# Patient Record
Sex: MALE | Race: OTHER | NOT HISPANIC OR LATINO
[De-identification: names, ages, dates, MRNs, and addresses within clinical notes are randomized per-mention and may not be internally consistent; named-entity substitution may affect disease eponyms.]

---

## 2023-08-10 ENCOUNTER — APPOINTMENT (OUTPATIENT)
Dept: ORTHOPEDIC SURGERY | Facility: CLINIC | Age: 43
End: 2023-08-10
Payer: COMMERCIAL

## 2023-08-10 DIAGNOSIS — Z00.00 ENCOUNTER FOR GENERAL ADULT MEDICAL EXAMINATION W/OUT ABNORMAL FINDINGS: ICD-10-CM

## 2023-08-10 DIAGNOSIS — M77.11 LATERAL EPICONDYLITIS, RIGHT ELBOW: ICD-10-CM

## 2023-08-10 PROCEDURE — 99203 OFFICE O/P NEW LOW 30 MIN: CPT

## 2023-08-10 RX ORDER — CELECOXIB 200 MG/1
200 CAPSULE ORAL
Qty: 27 | Refills: 1 | Status: ACTIVE | COMMUNITY
Start: 2023-08-10 | End: 1900-01-01

## 2023-08-10 NOTE — PHYSICAL EXAM
[de-identified] : Right elbow with definite tenderness in the lateral epicondylar region.  There is no erythema and no swelling range of motion of the elbow is full.  There is no instability varus valgus stress at full extension.

## 2023-08-10 NOTE — HISTORY OF PRESENT ILLNESS
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  Patient states he knows he has a diagnosis of tennis elbow he was given a cortisone injection 3 months ago afforded maybe 2 weeks of relief with having consistent discomfort in the elbow ever since sometimes it is quite severe it does not seem to be improving.

## 2023-08-10 NOTE — DISCUSSION/SUMMARY
[de-identified] : Patient I reviewed the underlying etiology of the patient's right elbow symptoms.  He has a clinical exam and history are consistent with a diagnosis of tennis elbow/lateral epicondyle this.  We talked about therapeutic modalities patient has been doing physical therapy already with minimal sustained relief had a one-time cortisone injection the past which afforded only 2 weeks of relief.  This point no one has yet placed on anti-inflammatory replacement Celebrex 200 mg p.o. twice daily for 5-day course then to be taken thereafter as needed.  The reason risk and benefits of medication discussed in detail potential side effects we reviewed his current medication profile there appears to be no relative contraindication to its use.  Patient will return in a week if not thoroughly improved at that point we may consider cortisone injection and possible Aircast for chronic suppression.  Today's consultation was 30 minutes.

## 2023-11-08 ENCOUNTER — APPOINTMENT (OUTPATIENT)
Age: 43
End: 2023-11-08

## 2023-11-08 ENCOUNTER — EMERGENCY (EMERGENCY)
Facility: HOSPITAL | Age: 43
LOS: 1 days | Discharge: HOME | End: 2023-11-08
Payer: COMMERCIAL

## 2023-11-08 VITALS
SYSTOLIC BLOOD PRESSURE: 155 MMHG | RESPIRATION RATE: 18 BRPM | HEIGHT: 71 IN | WEIGHT: 158.73 LBS | DIASTOLIC BLOOD PRESSURE: 93 MMHG | TEMPERATURE: 98 F | OXYGEN SATURATION: 99 % | HEART RATE: 77 BPM

## 2023-11-08 PROCEDURE — 99283 EMERGENCY DEPT VISIT LOW MDM: CPT

## 2023-11-08 PROCEDURE — 99283 EMERGENCY DEPT VISIT LOW MDM: CPT | Mod: 25

## 2023-11-08 PROCEDURE — 96372 THER/PROPH/DIAG INJ SC/IM: CPT

## 2023-11-08 RX ORDER — KETOROLAC TROMETHAMINE 30 MG/ML
15 SYRINGE (ML) INJECTION ONCE
Refills: 0 | Status: DISCONTINUED | OUTPATIENT
Start: 2023-11-08 | End: 2023-11-08

## 2023-11-08 RX ADMIN — Medication 15 MILLIGRAM(S): at 11:49

## 2023-11-08 NOTE — ED PROVIDER NOTE - PATIENT PORTAL LINK FT
You can access the FollowMyHealth Patient Portal offered by [facility] by registering at the following website: [email or web address]. By joining [facility]’s FollowMyHealth portal, you will also be able to view your health information using other applications (apps) compatible with our system.

## 2023-11-08 NOTE — ED PROVIDER NOTE - OBJECTIVE STATEMENT
43-year-old male no past medical history presenting to emergency department for right calf pain.  Patient states he was running down the stairs this morning when he felt a pulling sensation in the right calf,  Immediately felt pain.  Went to work but states pain  worsened prompting ED arrival.  Using his fathers cane to ambulate. Denies fall, head strike, numbness, other complaint.

## 2023-11-08 NOTE — ED ADULT NURSE NOTE - NSFALLUNIVINTERV_ED_ALL_ED
Bed/Stretcher in lowest position, wheels locked, appropriate side rails in place/Call bell, personal items and telephone in reach/Instruct patient to call for assistance before getting out of bed/chair/stretcher/Non-slip footwear applied when patient is off stretcher/[place] to call system/Physically safe environment - no spills, clutter or unnecessary equipment/Purposeful proactive rounding/Room/bathroom lighting operational, light cord in reach

## 2023-11-08 NOTE — ED PROVIDER NOTE - NSFOLLOWUPINSTRUCTIONS_ED_ALL_ED_FT
1) Follow up with your doctor as discussed  2) Return to the ED immediately for new or worsening symptoms like numbness, inability to ambulate,   3) Please continue to take any home medications as prescribed  4) No sports for two weeks

## 2023-11-08 NOTE — ED PROVIDER NOTE - NSFOLLOWUPCLINICS_GEN_ALL_ED_FT
[facility]  Orthopedics  [address] [place] [address]  Phone: [phone]  Fax: [phone]

## 2023-11-08 NOTE — ED PROVIDER NOTE - PHYSICAL EXAMINATION
Gen: NAD, AOx3, able to make needs known, non-toxic  Head: NCAT  HEENT: EOMI, oral mucosa moist, normal conjunctiva  Lung: CTAB, no respiratory distress, no wheezes/rhonchi/rales B/L, speaking in full sentences  CV: RRR, no murmurs  Abd: non distended, soft, nontender, no guarding, no CVA tenderness  MSK: No bony tenderness, FROM all 4 extremities,  No discoloration, erythema, ecchymosis, deformity or swelling. Popliteal, dorsalis pedis and posterior tibial pulses equally strong 2+ bilaterally. Capillary refill in lower extremity < 2 seconds. Full range of motion during flexion, extension and hyperextension. No evidence of locked knee.  No patellar, femur or tibia tenderness on palpation. No crepitus. Able to bear weight. No joint laxity during varus and valgus stress test. Soft calf compartments, sensation intact  Neuro: Appears non focal  Skin: Warm, well perfused, no rash  Psych: normal affect

## 2023-11-08 NOTE — ED PROVIDER NOTE - CLINICAL SUMMARY MEDICAL DECISION MAKING FREE TEXT BOX
43-year-old male no past medical history presenting to emergency department for right calf pain.  Patient states he was running down the stairs this morning when he felt a pulling sensation in the right calf,  Immediately felt pain.  Went to work but states pain  worsened prompting ED arrival.  Using his fathers cane to ambulate. Denies fall, head strike, numbness, other complaint. Do not suspect fx, likely partial muscle tear,  sprain vs strain vs spasm. Plan for analgesia, ace wrap, reassess, ortho follow up

## 2024-08-27 ENCOUNTER — APPOINTMENT (OUTPATIENT)
Dept: ORTHOPEDIC SURGERY | Facility: CLINIC | Age: 44
End: 2024-08-27

## 2024-09-27 ENCOUNTER — APPOINTMENT (OUTPATIENT)
Dept: ORTHOPEDIC SURGERY | Facility: CLINIC | Age: 44
End: 2024-09-27

## 2024-09-27 DIAGNOSIS — Z78.9 OTHER SPECIFIED HEALTH STATUS: ICD-10-CM

## 2024-09-27 DIAGNOSIS — M76.31 ILIOTIBIAL BAND SYNDROME, RIGHT LEG: ICD-10-CM

## 2024-09-27 DIAGNOSIS — M22.2X1 PATELLOFEMORAL DISORDERS, RIGHT KNEE: ICD-10-CM

## 2024-09-27 DIAGNOSIS — M22.2X2 PATELLOFEMORAL DISORDERS, RIGHT KNEE: ICD-10-CM

## 2024-09-27 PROCEDURE — 99203 OFFICE O/P NEW LOW 30 MIN: CPT

## 2024-09-27 PROCEDURE — 73562 X-RAY EXAM OF KNEE 3: CPT | Mod: 50

## 2024-09-27 NOTE — ASSESSMENT
[FreeTextEntry1] : - HEP given - Discussed activity and work modifications - Ice, tylenol, NSAID prn - knee pads at work - Follow up as needed. Can call for PT script if desired.

## 2024-09-27 NOTE — HISTORY OF PRESENT ILLNESS
[7] : 7 [Sharp] : sharp [Constant] : constant [Work] : work [Walking/activity] : walking/activity [Full time] : Work status: full time [de-identified] : 09/27/2024: Patient is a 44 year y.o. male presenting for evaluation of bilateral knee pain. pt states that for the last 6 months he has been having knee pain. he notes that when he is bending his knee for a long time or in general, he feels pain. pain has been constant over the last 6 months not getting worse or better. denies n/t. has had some swelling in his right knee that eventually went away. Has not taken anything for the pain.  Works as a  down in squatted position for long periods.     If adult, ask if smoker, on blood thinner, or diabetic: pre diabetic  [] : no [FreeTextEntry1] : bilateral knee  [de-identified] : bending the knee  [de-identified] :

## 2024-09-27 NOTE — IMAGING
[Bilateral] : knee bilaterally [There are no fractures, subluxations or dislocations. No significant abnormalities are seen] : There are no fractures, subluxations or dislocations. No significant abnormalities are seen [de-identified] : L knee: - No obvious deformity or swelling - No pain with palpation of medial or lateral joint line. - medial patellar facet pain - ROM from 135 degrees of flexion to 0 degrees extension. - 5/5 strength with knee flexion and extension - Stable varus, valgus, Lachman's, posterior drawer testing - Negative Erick's - Negative patellar grind - Distally neurovascularly intact.    R knee: - No obvious deformity or swelling - Pain with palpation of distal IT band - No medial or lateral joint line pain - ROM from 135 degrees of flexion to 0 degrees extension. - 5/5 strength with knee flexion and extension - Stable varus, valgus, Lachman's, posterior drawer testing - Negative Erick's - Negative patellar grind - Distally neurovascularly intact.

## 2024-10-01 ENCOUNTER — APPOINTMENT (OUTPATIENT)
Dept: ORTHOPEDIC SURGERY | Facility: CLINIC | Age: 44
End: 2024-10-01
Payer: COMMERCIAL

## 2024-10-01 DIAGNOSIS — M77.02 MEDIAL EPICONDYLITIS, LEFT ELBOW: ICD-10-CM

## 2024-10-01 DIAGNOSIS — M77.01 MEDIAL EPICONDYLITIS, RIGHT ELBOW: ICD-10-CM

## 2024-10-01 PROCEDURE — 99203 OFFICE O/P NEW LOW 30 MIN: CPT

## 2024-10-01 PROCEDURE — 73080 X-RAY EXAM OF ELBOW: CPT | Mod: 50

## 2024-10-01 NOTE — HISTORY OF PRESENT ILLNESS
[Dull/Aching] : dull/aching [] : yes [de-identified] : 10/1/24: 45yo male (RHD. Jose) presents for LEFT > RIGHT medial elbow pain, on and off x 1 year. Denies injury. Not currently taking any medication for this. Reports h/o RIGHT tennis elbow, which was treated with injection, and pain resolved after ~1 year.   I reviewed external record - office note, Dr To Goodman on 8/10/23 => DX RIGHT lateral epicondylitis, reported h/o CSI and PT => prescribed Celebrex.  Hx: none. [FreeTextEntry5] : KEON callaway [RHD] 44 year old male is here today c/o intermittent BILATERAL elbow pain x 3 years w/o injury. reports pain related to work (). h/o CSI ~2-3 years ago with relief for ~2 years. tried rest and ice with flare ups. denies radiating pain.

## 2024-10-01 NOTE — ASSESSMENT
[FreeTextEntry1] : The condition was explained to the patient. Discussed that the natural history of epicondylitis is self-limited and most cases resolve by 1 year. Recommend symptomatic treatment in the interim - activity modification, ice, NSAID, brace, PT, steroid vs PRP injection. Can consider surgery if conservative management fails and symptoms persist >1y. - recommend wrist brace for sleep and PRN lifting activity. - prescribed PT and Acupuncture for elbow. - activity modification PRN. - recommend ice and OTC pain medications such as Tylenol and NSAIDs as needed. reviewed contra-indicated medical conditions (eg liver disease, kidney disease, or GI ulcer/bleeding) or medications (eg blood thinners). Discussed possible GI and blood pressure side effects.  F/u PRN.

## 2024-10-01 NOTE — HISTORY OF PRESENT ILLNESS
[Dull/Aching] : dull/aching [] : yes [de-identified] : 10/1/24: 43yo male (RHD. Jose) presents for LEFT > RIGHT medial elbow pain, on and off x 1 year. Denies injury. Not currently taking any medication for this. Reports h/o RIGHT tennis elbow, which was treated with injection, and pain resolved after ~1 year.   I reviewed external record - office note, Dr To Goodman on 8/10/23 => DX RIGHT lateral epicondylitis, reported h/o CSI and PT => prescribed Celebrex.  Hx: none. [FreeTextEntry5] : KEON callaway [RHD] 44 year old male is here today c/o intermittent BILATERAL elbow pain x 3 years w/o injury. reports pain related to work (). h/o CSI ~2-3 years ago with relief for ~2 years. tried rest and ice with flare ups. denies radiating pain.

## 2024-10-01 NOTE — IMAGING
[de-identified] : LEFT ELBOW skin intact. no swelling. TTP to medial epicondyle. elbow ROM: good extension, flexion. good pronation, supination. wrist ROM: good extension, flexion. good digital extension, flex to full fist. no ulnar nerve subluxation. + Tinels at cubital tunnel. sensation intact to light touch. palpable radial pulse. + pain with resisted pronation. 1st DI 5/5.   RIGHT ELBOW skin intact. no swelling. TTP to medial epicondyle. elbow ROM: good extension, flexion. good pronation, supination. wrist ROM: good extension, flexion. good digital extension, flex to full fist. + ulnar nerve subluxation. + Tinels at cubital tunnel. sensation intact to light touch. palpable radial pulse. + pain with resisted pronation > resisted wrist flexion. 1st DI 5/5.   XRAYS OF LEFT ELBOW (3 views - AP, OBLIQUE, AND LATERAL VIEWS): no acute displaced fracture or dislocation. XRAYS OF RIGHT ELBOW (3 views - PA, OBLIQUE, AND LATERAL VIEWS): no acute displaced fracture or dislocation.

## 2024-10-01 NOTE — IMAGING
[de-identified] : LEFT ELBOW skin intact. no swelling. TTP to medial epicondyle. elbow ROM: good extension, flexion. good pronation, supination. wrist ROM: good extension, flexion. good digital extension, flex to full fist. no ulnar nerve subluxation. + Tinels at cubital tunnel. sensation intact to light touch. palpable radial pulse. + pain with resisted pronation. 1st DI 5/5.   RIGHT ELBOW skin intact. no swelling. TTP to medial epicondyle. elbow ROM: good extension, flexion. good pronation, supination. wrist ROM: good extension, flexion. good digital extension, flex to full fist. + ulnar nerve subluxation. + Tinels at cubital tunnel. sensation intact to light touch. palpable radial pulse. + pain with resisted pronation > resisted wrist flexion. 1st DI 5/5.   XRAYS OF LEFT ELBOW (3 views - AP, OBLIQUE, AND LATERAL VIEWS): no acute displaced fracture or dislocation. XRAYS OF RIGHT ELBOW (3 views - PA, OBLIQUE, AND LATERAL VIEWS): no acute displaced fracture or dislocation.

## 2024-10-04 ENCOUNTER — APPOINTMENT (OUTPATIENT)
Dept: ORTHOPEDIC SURGERY | Facility: CLINIC | Age: 44
End: 2024-10-04

## 2024-10-12 ENCOUNTER — EMERGENCY (EMERGENCY)
Facility: HOSPITAL | Age: 44
LOS: 1 days | Discharge: HOME | End: 2024-10-12
Attending: STUDENT IN AN ORGANIZED HEALTH CARE EDUCATION/TRAINING PROGRAM
Payer: COMMERCIAL

## 2024-10-12 VITALS
HEIGHT: 71 IN | OXYGEN SATURATION: 97 % | WEIGHT: 161.6 LBS | RESPIRATION RATE: 18 BRPM | TEMPERATURE: 99 F | SYSTOLIC BLOOD PRESSURE: 160 MMHG | DIASTOLIC BLOOD PRESSURE: 97 MMHG | HEART RATE: 81 BPM

## 2024-10-12 PROCEDURE — 99285 EMERGENCY DEPT VISIT HI MDM: CPT

## 2024-10-12 NOTE — ED ADULT TRIAGE NOTE - CHIEF COMPLAINT QUOTE
Pt stated he stated sweating and felt dizzy "unsteady on his feet" and he passed out this occurred 1/2 hour ago. Pt stated he has been feeling anxious since his dog  3 weeks ago.

## 2024-10-13 VITALS
RESPIRATION RATE: 18 BRPM | DIASTOLIC BLOOD PRESSURE: 77 MMHG | OXYGEN SATURATION: 98 % | HEART RATE: 78 BPM | SYSTOLIC BLOOD PRESSURE: 124 MMHG | TEMPERATURE: 98 F

## 2024-10-13 LAB
ALBUMIN SERPL ELPH-MCNC: 4 G/DL — SIGNIFICANT CHANGE UP (ref 3.5–5)
ALP SERPL-CCNC: 45 U/L — SIGNIFICANT CHANGE UP (ref 40–120)
ALT FLD-CCNC: 30 U/L DA — SIGNIFICANT CHANGE UP (ref 10–60)
ANION GAP SERPL CALC-SCNC: 7 MMOL/L — SIGNIFICANT CHANGE UP (ref 5–17)
AST SERPL-CCNC: 12 U/L — SIGNIFICANT CHANGE UP (ref 10–40)
BASOPHILS # BLD AUTO: 0.05 K/UL — SIGNIFICANT CHANGE UP (ref 0–0.2)
BASOPHILS NFR BLD AUTO: 0.5 % — SIGNIFICANT CHANGE UP (ref 0–2)
BILIRUB SERPL-MCNC: 0.3 MG/DL — SIGNIFICANT CHANGE UP (ref 0.2–1.2)
BUN SERPL-MCNC: 22 MG/DL — HIGH (ref 7–18)
CALCIUM SERPL-MCNC: 9.2 MG/DL — SIGNIFICANT CHANGE UP (ref 8.4–10.5)
CHLORIDE SERPL-SCNC: 106 MMOL/L — SIGNIFICANT CHANGE UP (ref 96–108)
CO2 SERPL-SCNC: 28 MMOL/L — SIGNIFICANT CHANGE UP (ref 22–31)
CREAT SERPL-MCNC: 1.33 MG/DL — HIGH (ref 0.5–1.3)
D DIMER BLD IA.RAPID-MCNC: 269 NG/ML DDU — HIGH
EGFR: 67 ML/MIN/1.73M2 — SIGNIFICANT CHANGE UP
EOSINOPHIL # BLD AUTO: 0.18 K/UL — SIGNIFICANT CHANGE UP (ref 0–0.5)
EOSINOPHIL NFR BLD AUTO: 1.9 % — SIGNIFICANT CHANGE UP (ref 0–6)
GLUCOSE SERPL-MCNC: 117 MG/DL — HIGH (ref 70–99)
HCT VFR BLD CALC: 43.3 % — SIGNIFICANT CHANGE UP (ref 39–50)
HCV AB S/CO SERPL IA: 0.09 S/CO — SIGNIFICANT CHANGE UP (ref 0–0.99)
HCV AB SERPL-IMP: SIGNIFICANT CHANGE UP
HGB BLD-MCNC: 14.7 G/DL — SIGNIFICANT CHANGE UP (ref 13–17)
HIV 1 & 2 AB SERPL IA.RAPID: SIGNIFICANT CHANGE UP
IMM GRANULOCYTES NFR BLD AUTO: 0.3 % — SIGNIFICANT CHANGE UP (ref 0–0.9)
LIDOCAIN IGE QN: 152 U/L — HIGH (ref 13–75)
LYMPHOCYTES # BLD AUTO: 1.83 K/UL — SIGNIFICANT CHANGE UP (ref 1–3.3)
LYMPHOCYTES # BLD AUTO: 18.8 % — SIGNIFICANT CHANGE UP (ref 13–44)
MAGNESIUM SERPL-MCNC: 2 MG/DL — SIGNIFICANT CHANGE UP (ref 1.6–2.6)
MCHC RBC-ENTMCNC: 29.9 PG — SIGNIFICANT CHANGE UP (ref 27–34)
MCHC RBC-ENTMCNC: 33.9 GM/DL — SIGNIFICANT CHANGE UP (ref 32–36)
MCV RBC AUTO: 88.2 FL — SIGNIFICANT CHANGE UP (ref 80–100)
MONOCYTES # BLD AUTO: 0.81 K/UL — SIGNIFICANT CHANGE UP (ref 0–0.9)
MONOCYTES NFR BLD AUTO: 8.3 % — SIGNIFICANT CHANGE UP (ref 2–14)
NEUTROPHILS # BLD AUTO: 6.81 K/UL — SIGNIFICANT CHANGE UP (ref 1.8–7.4)
NEUTROPHILS NFR BLD AUTO: 70.2 % — SIGNIFICANT CHANGE UP (ref 43–77)
NRBC # BLD: 0 /100 WBCS — SIGNIFICANT CHANGE UP (ref 0–0)
NT-PROBNP SERPL-SCNC: 21 PG/ML — SIGNIFICANT CHANGE UP (ref 0–125)
PLATELET # BLD AUTO: 189 K/UL — SIGNIFICANT CHANGE UP (ref 150–400)
POTASSIUM SERPL-MCNC: 3.6 MMOL/L — SIGNIFICANT CHANGE UP (ref 3.5–5.3)
POTASSIUM SERPL-SCNC: 3.6 MMOL/L — SIGNIFICANT CHANGE UP (ref 3.5–5.3)
PROT SERPL-MCNC: 7.8 G/DL — SIGNIFICANT CHANGE UP (ref 6–8.3)
RBC # BLD: 4.91 M/UL — SIGNIFICANT CHANGE UP (ref 4.2–5.8)
RBC # FLD: 12.2 % — SIGNIFICANT CHANGE UP (ref 10.3–14.5)
SODIUM SERPL-SCNC: 141 MMOL/L — SIGNIFICANT CHANGE UP (ref 135–145)
TROPONIN I, HIGH SENSITIVITY RESULT: 5.4 NG/L — SIGNIFICANT CHANGE UP
WBC # BLD: 9.71 K/UL — SIGNIFICANT CHANGE UP (ref 3.8–10.5)
WBC # FLD AUTO: 9.71 K/UL — SIGNIFICANT CHANGE UP (ref 3.8–10.5)

## 2024-10-13 PROCEDURE — 71045 X-RAY EXAM CHEST 1 VIEW: CPT

## 2024-10-13 PROCEDURE — 70450 CT HEAD/BRAIN W/O DYE: CPT | Mod: 26,MC

## 2024-10-13 PROCEDURE — 85379 FIBRIN DEGRADATION QUANT: CPT

## 2024-10-13 PROCEDURE — 71275 CT ANGIOGRAPHY CHEST: CPT | Mod: 26,MC

## 2024-10-13 PROCEDURE — 36415 COLL VENOUS BLD VENIPUNCTURE: CPT

## 2024-10-13 PROCEDURE — 99285 EMERGENCY DEPT VISIT HI MDM: CPT | Mod: 25

## 2024-10-13 PROCEDURE — 83690 ASSAY OF LIPASE: CPT

## 2024-10-13 PROCEDURE — 71045 X-RAY EXAM CHEST 1 VIEW: CPT | Mod: 26

## 2024-10-13 PROCEDURE — 80053 COMPREHEN METABOLIC PANEL: CPT

## 2024-10-13 PROCEDURE — 93010 ELECTROCARDIOGRAM REPORT: CPT

## 2024-10-13 PROCEDURE — 70450 CT HEAD/BRAIN W/O DYE: CPT | Mod: MC

## 2024-10-13 PROCEDURE — 86703 HIV-1/HIV-2 1 RESULT ANTBDY: CPT

## 2024-10-13 PROCEDURE — 84484 ASSAY OF TROPONIN QUANT: CPT

## 2024-10-13 PROCEDURE — 83880 ASSAY OF NATRIURETIC PEPTIDE: CPT

## 2024-10-13 PROCEDURE — 86803 HEPATITIS C AB TEST: CPT

## 2024-10-13 PROCEDURE — 83735 ASSAY OF MAGNESIUM: CPT

## 2024-10-13 PROCEDURE — 93005 ELECTROCARDIOGRAM TRACING: CPT

## 2024-10-13 PROCEDURE — 71275 CT ANGIOGRAPHY CHEST: CPT | Mod: MC

## 2024-10-13 PROCEDURE — 85025 COMPLETE CBC W/AUTO DIFF WBC: CPT

## 2024-10-13 RX ORDER — SODIUM CHLORIDE 0.9 % (FLUSH) 0.9 %
1000 SYRINGE (ML) INJECTION ONCE
Refills: 0 | Status: COMPLETED | OUTPATIENT
Start: 2024-10-13 | End: 2024-10-13

## 2024-10-13 RX ADMIN — Medication 1000 MILLILITER(S): at 00:43

## 2024-10-13 NOTE — ED ADULT NURSE NOTE - NSFALLUNIVINTERV_ED_ALL_ED
Bed/Stretcher in lowest position, wheels locked, appropriate side rails in place/Call bell, personal items and telephone in reach/Instruct patient to call for assistance before getting out of bed/chair/stretcher/Non-slip footwear applied when patient is off stretcher/[place] to call system/Physically safe environment - no spills, clutter or unnecessary equipment/Purposeful proactive rounding/Room/bathroom lighting operational, light cord in reach

## 2024-10-13 NOTE — ED PROVIDER NOTE - OBJECTIVE STATEMENT
44-year-old male, no significant PMH, presenting after syncopal episode.  Patient reports he was walking in his house when he suddenly began to feel unsteady on his feet, began sweating, felt lightheaded and then lost consciousness.  Reports when he woke up he still felt a little bit shaky.  No headache, chest pain, trouble breathing or abdominal pain. Patient reports he has been under a lot of stress and feeling a lot of anxiety recently as his dog passed away 3 weeks ago

## 2024-10-13 NOTE — ED PROVIDER NOTE - PROGRESS NOTE DETAILS
patient updated on results. awaiting CTA of chest. Tavares Garcia no emergent lab or CT findings. stable for outpatient followup. Tavares Martinez

## 2024-10-13 NOTE — ED ADULT NURSE NOTE - OBJECTIVE STATEMENT
Patient presented to ED s/p syncopal episode  at home. reports he was walking in his house when he suddenly began to feel unsteady on his feet, began sweating, felt lightheaded and then lost consciousness for few seconds.

## 2024-10-13 NOTE — ED PROVIDER NOTE - PHYSICAL EXAMINATION
General: well appearing male, no acute distress   HEENT: normocephalic, atraumatic   Respiratory: normal work of breathing  Cardiac: regular rate and rhythm   Abdomen: soft, non-tender, no guarding or rebound   MSK: no swelling or tenderness of lower extremities, moving all extremities spontaneously   Skin: warm, dry   Neuro: A&Ox3, cranial nerves II-XII intact, 5/5 Sterngth in all extremities, normal gait   Psych: appropriate affect

## 2024-10-13 NOTE — ED PROVIDER NOTE - PATIENT PORTAL LINK FT
You can access the FollowMyHealth Patient Portal offered by [facility] by registering at the following website: [email or web address]. By joining [facility]’s FollowMyHealth portal, you will also be able to view your health information using other applications (apps) compatible with our system.

## 2024-10-13 NOTE — ED PROVIDER NOTE - CLINICAL SUMMARY MEDICAL DECISION MAKING FREE TEXT BOX
44-year-old male presenting after a syncopal episode.  Patient denies any chest pain, dizziness, trouble breathing.  Low concern for ACS or PE.  Also low concern for CVA.  Patient with nonfocal neurological exam.  Symptoms likely secondary to vasovagal syncope but will obtain labs, CT head and EKG to assess.

## 2024-11-26 ENCOUNTER — APPOINTMENT (OUTPATIENT)
Dept: ORTHOPEDIC SURGERY | Facility: CLINIC | Age: 44
End: 2024-11-26
Payer: COMMERCIAL

## 2024-11-26 DIAGNOSIS — M77.02 MEDIAL EPICONDYLITIS, LEFT ELBOW: ICD-10-CM

## 2024-11-26 DIAGNOSIS — M77.01 MEDIAL EPICONDYLITIS, RIGHT ELBOW: ICD-10-CM

## 2024-11-26 PROCEDURE — 20551 NJX 1 TENDON ORIGIN/INSJ: CPT | Mod: LT

## 2024-11-26 PROCEDURE — 99214 OFFICE O/P EST MOD 30 MIN: CPT | Mod: 25

## 2025-01-07 ENCOUNTER — APPOINTMENT (OUTPATIENT)
Dept: ORTHOPEDIC SURGERY | Facility: CLINIC | Age: 45
End: 2025-01-07

## 2025-01-24 ENCOUNTER — APPOINTMENT (OUTPATIENT)
Dept: ORTHOPEDIC SURGERY | Facility: CLINIC | Age: 45
End: 2025-01-24